# Patient Record
Sex: MALE | Race: WHITE | NOT HISPANIC OR LATINO | URBAN - METROPOLITAN AREA
[De-identification: names, ages, dates, MRNs, and addresses within clinical notes are randomized per-mention and may not be internally consistent; named-entity substitution may affect disease eponyms.]

---

## 2023-03-14 ENCOUNTER — OFFICE VISIT (OUTPATIENT)
Dept: URBAN - METROPOLITAN AREA CLINIC 63 | Facility: CLINIC | Age: 28
End: 2023-03-14
Payer: COMMERCIAL

## 2023-03-14 ENCOUNTER — WEB ENCOUNTER (OUTPATIENT)
Dept: URBAN - METROPOLITAN AREA CLINIC 63 | Facility: CLINIC | Age: 28
End: 2023-03-14

## 2023-03-14 VITALS
OXYGEN SATURATION: 98 % | HEIGHT: 70 IN | HEART RATE: 89 BPM | WEIGHT: 315 LBS | SYSTOLIC BLOOD PRESSURE: 120 MMHG | BODY MASS INDEX: 45.1 KG/M2 | TEMPERATURE: 96.3 F | DIASTOLIC BLOOD PRESSURE: 80 MMHG

## 2023-03-14 DIAGNOSIS — K62.5 RECTAL BLEEDING: ICD-10-CM

## 2023-03-14 DIAGNOSIS — K64.8 OTHER HEMORRHOIDS: ICD-10-CM

## 2023-03-14 DIAGNOSIS — E66.01 MORBID OBESITY: ICD-10-CM

## 2023-03-14 PROBLEM — 238136002: Status: ACTIVE | Noted: 2023-03-14

## 2023-03-14 PROCEDURE — 99205 OFFICE O/P NEW HI 60 MIN: CPT | Performed by: NURSE PRACTITIONER

## 2023-03-14 RX ORDER — MULTIVIT-MIN/FOLIC/VIT K/LYCOP 400-300MCG
1 TABLET WITH WATER OR JUICE BETWEEN MEALS TABLET ORAL ONCE A DAY
Status: ACTIVE | COMMUNITY

## 2023-03-14 RX ORDER — ONDANSETRON HYDROCHLORIDE 4 MG/1
1 TABLET TABLET, FILM COATED ORAL
Qty: 2 | Refills: 0 | OUTPATIENT
Start: 2023-03-14

## 2023-03-14 RX ORDER — SOY PROTEIN
AS DIRECTED POWDER (GRAM) ORAL
Status: ACTIVE | COMMUNITY

## 2023-03-14 RX ORDER — HYDROCORTISONE 25 MG/G
1 APPLICATION CREAM TOPICAL TWICE A DAY
Qty: 1 | Refills: 1 | OUTPATIENT
Start: 2023-03-14

## 2023-03-14 RX ORDER — FLUOXETINE HYDROCHLORIDE 20 MG/1
1 CAPSULE CAPSULE ORAL ONCE A DAY
Status: ACTIVE | COMMUNITY

## 2023-03-14 NOTE — HPI-PREVIOUS IMAGING
CT abdomen and pelvis/09 March 2023.  1.  No high-grade stenosis.  No aneurysm or dissection.  2.  No evidence of gastrointestinal hemorrhage.  3.  Decreased hepatic attenuation may be related to underlying edema/hepatitis or hepatic steatosis.  Correlation with lab values and physical examination is recommended.

## 2023-03-14 NOTE — HPI-HPI
Thank you very much for kindly referring Terry Andersen, a very pleasant 27-year-old male, to our service due to rectal bleeding.  Past medical history significant for asthma, OCD, IBS-D, anxiety, obesity and GERD.  Past surgical history is none.  He is EGD and colonoscopy naive and relates a maternal history of unresectable colon polyp, but denies a family history of colon cancer or other GI malignancy.  He does relate 2 days use of suppositories that have provided not much benefit.  Terry presents today complaining of painless rectal bleeding that has been going on for the past 2 months and frequent bowel movements (7/day) that are exacerbated by anxiety.  He was previously diagnosed with IBS-D but relates he has never had rectal bleeding before.  Also, he relates a low-grade fever (100F) with his last episode of rectal bleeding and a, "uneasiness in my stomach" that improves with time.  He is otherwise asymptomatic from a GI perspective and relates his normal bowel habits are typically 2/day of soft brown stool.  He denies pyrosis, dysphagia, dyspepsia, abdominal pain, hematochezia, melena or unintentional weight loss.  Recent ER evaluation due to rectal bleeding demonstrated hemorrhoidal disease and he was supposed to have been prescribed Proctosol, but he states he never did (see ER summary below).  **********  As per [facility] ED provider note dated 09 March 2023 (Eduardo Owen DO): "Patient is a 27 y.o. male who presents the emergency department via Walk-in with complaints of abdominal pain, nausea, fever and bloody stools that he has had for the last 2 days.  Reports having approximately 8 episodes per day.  All episodes have involved where of blood per rectum.  States that there is enough blood to turn the toilet bowl water red.  Does have a history of hemorrhoids and IBS.  Does not take any blood thinners.  States he also had a fever with a Tmax of 100  degree(s) F. Physical exam demonstrates mild abdominal tenderness in the periumbilical area and a small, nonthrombosed external hemorrhoid that is not actively bleeding located in the 7 o'clock position. ER course: Patient presents to the emergency department for abdominal pain, diarrhea, rectal bleeding, nausea and fever that he has had for the last 2 days.  He has a history of IBS.  On exam, he has mild periumbilical tenderness.  He has an external hemorrhoid that is nonthrombosed and not actively bleeding. 6:59 PM: Upon reassessment, patient is resting comfortably.  Upon review of the results, patient does have anemia with a hemoglobin of 12.4.  Uncertain as to the chronicity of this finding.  Patient does have leukopenia which is nonspecific.  Patient will be referred to hematology for further evaluation of his leukopenia.  Patient's BUN is normal which I would expect to be elevated in setting of an upper GI bleed.  Patient's bleeding likely lower GI in origin.  Etiology of bleeding is unclear.  Could be secondary to hemorrhoids but I feel patient requires further gastroenterology evaluation but I feel patient is stable to proceed this evaluation as an outpatient.  Patient will be prescribed Proctosol for treatment of his hemorrhoids and I stressed the importance of follow-up.  Additional verbal discharge instructions were given and discussed with the patient.  The patient was also instructed to return immediately if they get worse, do not get better, or if they develop any new or concerning symptoms.  The patient was instructed to follow-up with their PCP in the next 2 to 3 days.  Discussed results and plan with available patient/family/healthcare proxy.  All are in agreement with plan as listed above."

## 2023-03-14 NOTE — HPI-PREVIOUS LABS
Lab work dated 09 March 2023 demonstrates the following abnormalities: WBC 3.2, hemoglobin 12.4, hematocrit 37.2%, absolute lymphocytes 0.9, anion gap 5, total bilirubin 1.4.  All remaining lab values of CBC, CMP, PT/INR and TSH are within normal limits.

## 2023-03-21 ENCOUNTER — LAB OUTSIDE AN ENCOUNTER (OUTPATIENT)
Dept: URBAN - METROPOLITAN AREA CLINIC 63 | Facility: CLINIC | Age: 28
End: 2023-03-21

## 2023-07-18 ENCOUNTER — OFFICE VISIT (OUTPATIENT)
Dept: URBAN - METROPOLITAN AREA MEDICAL CENTER 15 | Facility: MEDICAL CENTER | Age: 28
End: 2023-07-18
Payer: COMMERCIAL

## 2023-07-18 DIAGNOSIS — K62.5 RECTAL BLEEDING: ICD-10-CM

## 2023-07-18 DIAGNOSIS — R19.7 DIARRHEA: ICD-10-CM

## 2023-07-18 DIAGNOSIS — K64.0 FIRST DEGREE HEMORRHOIDS: ICD-10-CM

## 2023-07-18 PROCEDURE — 45380 COLONOSCOPY AND BIOPSY: CPT | Performed by: INTERNAL MEDICINE

## 2023-07-28 ENCOUNTER — WEB ENCOUNTER (OUTPATIENT)
Dept: URBAN - METROPOLITAN AREA CLINIC 63 | Facility: CLINIC | Age: 28
End: 2023-07-28

## 2023-07-29 ENCOUNTER — TELEPHONE ENCOUNTER (OUTPATIENT)
Dept: URBAN - METROPOLITAN AREA CLINIC 63 | Facility: CLINIC | Age: 28
End: 2023-07-29

## 2023-08-03 ENCOUNTER — WEB ENCOUNTER (OUTPATIENT)
Dept: URBAN - METROPOLITAN AREA CLINIC 63 | Facility: CLINIC | Age: 28
End: 2023-08-03

## 2023-08-07 ENCOUNTER — WEB ENCOUNTER (OUTPATIENT)
Dept: URBAN - METROPOLITAN AREA CLINIC 63 | Facility: CLINIC | Age: 28
End: 2023-08-07

## 2023-08-29 ENCOUNTER — TELEPHONE ENCOUNTER (OUTPATIENT)
Dept: URBAN - METROPOLITAN AREA CLINIC 63 | Facility: CLINIC | Age: 28
End: 2023-08-29

## 2023-08-29 ENCOUNTER — OFFICE VISIT (OUTPATIENT)
Dept: URBAN - METROPOLITAN AREA CLINIC 63 | Facility: CLINIC | Age: 28
End: 2023-08-29
Payer: COMMERCIAL

## 2023-08-29 ENCOUNTER — WEB ENCOUNTER (OUTPATIENT)
Dept: URBAN - METROPOLITAN AREA CLINIC 63 | Facility: CLINIC | Age: 28
End: 2023-08-29

## 2023-08-29 VITALS
BODY MASS INDEX: 45.1 KG/M2 | WEIGHT: 315 LBS | HEART RATE: 95 BPM | TEMPERATURE: 97.3 F | SYSTOLIC BLOOD PRESSURE: 124 MMHG | OXYGEN SATURATION: 97 % | HEIGHT: 70 IN | DIASTOLIC BLOOD PRESSURE: 84 MMHG

## 2023-08-29 DIAGNOSIS — K50.114 CROHN'S DISEASE OF COLON WITH ABSCESS: ICD-10-CM

## 2023-08-29 PROBLEM — 50440006: Status: ACTIVE | Noted: 2023-08-29

## 2023-08-29 PROCEDURE — 99214 OFFICE O/P EST MOD 30 MIN: CPT | Performed by: NURSE PRACTITIONER

## 2023-08-29 RX ORDER — SOY PROTEIN
AS DIRECTED POWDER (GRAM) ORAL
Status: ACTIVE | COMMUNITY

## 2023-08-29 RX ORDER — RISANKIZUMAB-RZAA 360 MG/2.4
INJECT ONCE EVERY 56 DAYS WEARABLE INJECTOR SUBCUTANEOUS
Qty: 1 | Refills: 5 | OUTPATIENT
Start: 2024-01-22 | End: 2024-12-23

## 2023-08-29 RX ORDER — RISANKIZUMAB-RZAA 60 MG/ML
AS DIRECTED INJECTION INTRAVENOUS
OUTPATIENT
Start: 2023-08-29

## 2023-08-29 RX ORDER — ATOMOXETINE 40 MG/1
CAPSULE ORAL
Qty: 60 CAPSULE | Status: ACTIVE | COMMUNITY

## 2023-08-29 RX ORDER — MULTIVIT-MIN/FOLIC/VIT K/LYCOP 400-300MCG
1 TABLET WITH WATER OR JUICE BETWEEN MEALS TABLET ORAL ONCE A DAY
Status: ACTIVE | COMMUNITY

## 2023-08-29 NOTE — HPI-PREVIOUS LABS
Lab work dated 02 August 2023 demonstrates the following abnormalities: Hemoglobin 12.8, hematocrit 39.5%, total protein 8.3.  All remaining lab values of CBC, CMP and lactic acid are within normal limits. ********** Lab work dated 09 March 2023 demonstrates the following abnormalities: WBC 3.2, hemoglobin 12.4, hematocrit 37.2%, absolute lymphocytes 0.9, anion gap 5, total bilirubin 1.4.  All remaining lab values of CBC, CMP, PT/INR and TSH are within normal limits.

## 2023-08-29 NOTE — HPI-PREVIOUS PROCEDURES
Colonoscopy/18 July 2023.  Normal colonoscopy including terminal ileum, other than internal hemorrhoids. PATHOLOGY: Biopsies demonstrate mild chronic ileitis and reactive lymphoid follicle in the terminal ileum biopsy, benign mucosa with lymphoid follicle in the ascending colon polyp biopsy.  All remaining findings are negative or benign. IMPRESSION: The patient's bleeding is likely hemorrhoidal.  I see no other etiology.  I suspect his diarrhea is IBS related, but the pathology will be reviewed.

## 2023-08-29 NOTE — HPI-HPI
Terry Andersen is a very pleasant 28-year-old male seen in follow-up of colonoscopy and CT imaging (see results below).  He presents today feeling well but admits to 3-4 bowel movements per day of [place] 4-6 stool with occasional bright red blood on the paper with wiping.  He is otherwise asymptomatic from a GI perspective and denies pyrosis, dysphagia, abdominal pain, cramping, melena or unintentional weight loss.

## 2023-09-01 ENCOUNTER — WEB ENCOUNTER (OUTPATIENT)
Dept: URBAN - METROPOLITAN AREA CLINIC 63 | Facility: CLINIC | Age: 28
End: 2023-09-01

## 2023-09-25 ENCOUNTER — WEB ENCOUNTER (OUTPATIENT)
Dept: URBAN - METROPOLITAN AREA CLINIC 63 | Facility: CLINIC | Age: 28
End: 2023-09-25

## 2023-10-27 ENCOUNTER — WEB ENCOUNTER (OUTPATIENT)
Dept: URBAN - METROPOLITAN AREA CLINIC 63 | Facility: CLINIC | Age: 28
End: 2023-10-27

## 2023-10-31 ENCOUNTER — DASHBOARD ENCOUNTERS (OUTPATIENT)
Age: 28
End: 2023-10-31

## 2023-10-31 ENCOUNTER — OFFICE VISIT (OUTPATIENT)
Dept: URBAN - METROPOLITAN AREA CLINIC 63 | Facility: CLINIC | Age: 28
End: 2023-10-31
Payer: COMMERCIAL

## 2023-10-31 VITALS
HEIGHT: 70 IN | TEMPERATURE: 97.4 F | DIASTOLIC BLOOD PRESSURE: 82 MMHG | BODY MASS INDEX: 45.1 KG/M2 | HEART RATE: 90 BPM | WEIGHT: 315 LBS | OXYGEN SATURATION: 97 % | SYSTOLIC BLOOD PRESSURE: 126 MMHG

## 2023-10-31 DIAGNOSIS — K50.114 CROHN'S DISEASE OF COLON WITH ABSCESS: ICD-10-CM

## 2023-10-31 PROCEDURE — 99214 OFFICE O/P EST MOD 30 MIN: CPT | Performed by: NURSE PRACTITIONER

## 2023-10-31 RX ORDER — VILAZODONE HYDROCHLORIDE 10 MG/1
1 TABLET WITH FOOD TABLET, FILM COATED ORAL ONCE A DAY
Qty: 30 | Status: ACTIVE | COMMUNITY
Start: 2023-10-31

## 2023-10-31 RX ORDER — RISANKIZUMAB-RZAA 60 MG/ML
AS DIRECTED INJECTION INTRAVENOUS
Status: ACTIVE | COMMUNITY
Start: 2023-08-29

## 2023-10-31 RX ORDER — ATOMOXETINE 40 MG/1
CAPSULE ORAL
Qty: 60 CAPSULE | Status: ACTIVE | COMMUNITY

## 2023-10-31 RX ORDER — SOY PROTEIN
AS DIRECTED POWDER (GRAM) ORAL
Status: ACTIVE | COMMUNITY

## 2023-10-31 NOTE — HPI-HPI
Terry Andersen is a very pleasant 28-year-old male seen in follow-up of lab work and stool study secondary to Crohn's disease.  He is joined by his mother and has questions about the diagnosis, the findings of the procedures and imaging and long-term prognosis.  He presents today complaining of continued abdominal discomfort with 2-3 bowel movements per day of soft stool, but without blood or melena.  He does admit to use of medical THC via smoked bud, but denies use of tobacco products.  He is otherwise asymptomatic from a GI perspective and denies pyrosis, dysphagia, dyspepsia, severe abdominal pain or unintentional weight loss.

## 2023-10-31 NOTE — HPI-PREVIOUS IMAGING
CT abdomen and pelvis with contrast/02 August 2023.  1.  Mild fat stranding of the terminal ileum, nonspecific.  No evidence of appendicitis.  The perianal abscess appears grossly resolved.  Given the patient's age, and history of perianal fistula/abscess as well as slight abnormality of the terminal ileum, gastroenterology referral to evaluate for Crohn's disease is recommended.  2.  Hepatomegaly with severe hepatic steatosis.  Spleen at the upper limits of normal in size. ********** CT pelvis with contrast/29 July 2023.  Small midline posterior perianal abscess. ********** CT abdomen and pelvis/09 March 2023.  1.  No high-grade stenosis.  No aneurysm or dissection.  2.  No evidence of gastrointestinal hemorrhage.  3.  Decreased hepatic attenuation may be related to underlying edema/hepatitis or hepatic steatosis.  Correlation with lab values and physical examination is recommended.

## 2023-10-31 NOTE — HPI-PREVIOUS LABS
Lab work dated 23 October 2023 demonstrates the following abnormalities: Hemoglobin 12.8, hematocrit 38.4%, CRP 20.6. All remaining lab values of CBC, CMP, fecal lactoferrin, QuantiFERON-TB gold plus and HBV surface antigen are negative, nonreactive are within normal limits. Fecal calprotectin results pending. ********** Lab work dated 02 August 2023 demonstrates the following abnormalities: Hemoglobin 12.8, hematocrit 39.5%, total protein 8.3. All remaining lab values of CBC, CMP and lactic acid are within normal limits. ********** Lab work dated 09 March 2023 demonstrates the following abnormalities: WBC 3.2, hemoglobin 12.4, hematocrit 37.2%, absolute lymphocytes 0.9, anion gap 5, total bilirubin 1.4. All remaining lab values of CBC, CMP, PT/INR and TSH are within normal limits.

## 2023-11-08 ENCOUNTER — WEB ENCOUNTER (OUTPATIENT)
Dept: URBAN - METROPOLITAN AREA CLINIC 63 | Facility: CLINIC | Age: 28
End: 2023-11-08

## 2023-11-15 ENCOUNTER — WEB ENCOUNTER (OUTPATIENT)
Dept: URBAN - METROPOLITAN AREA CLINIC 63 | Facility: CLINIC | Age: 28
End: 2023-11-15

## 2024-08-07 ENCOUNTER — LAB OUTSIDE AN ENCOUNTER (OUTPATIENT)
Dept: URBAN - METROPOLITAN AREA CLINIC 57 | Facility: CLINIC | Age: 29
End: 2024-08-07

## 2024-08-07 ENCOUNTER — OFFICE VISIT (OUTPATIENT)
Dept: URBAN - METROPOLITAN AREA CLINIC 57 | Facility: CLINIC | Age: 29
End: 2024-08-07
Payer: COMMERCIAL

## 2024-08-07 VITALS
SYSTOLIC BLOOD PRESSURE: 128 MMHG | DIASTOLIC BLOOD PRESSURE: 82 MMHG | HEART RATE: 84 BPM | BODY MASS INDEX: 45.1 KG/M2 | OXYGEN SATURATION: 97 % | WEIGHT: 315 LBS | HEIGHT: 70 IN

## 2024-08-07 DIAGNOSIS — K50.114 CROHN'S DISEASE OF COLON WITH ABSCESS: ICD-10-CM

## 2024-08-07 DIAGNOSIS — K60.3 PERIANAL FISTULA: ICD-10-CM

## 2024-08-07 PROCEDURE — 99214 OFFICE O/P EST MOD 30 MIN: CPT

## 2024-08-07 RX ORDER — VILAZODONE HYDROCHLORIDE 10 MG/1
1 TABLET WITH FOOD TABLET, FILM COATED ORAL ONCE A DAY
Qty: 30 | Status: ACTIVE | COMMUNITY
Start: 2023-10-31

## 2024-08-07 RX ORDER — SOY PROTEIN
AS DIRECTED POWDER (GRAM) ORAL
Status: ACTIVE | COMMUNITY

## 2024-08-07 RX ORDER — RISANKIZUMAB-RZAA 360 MG/2.4
INJECT ONE 360MG CARTRIDGE ONCE EVERY 8 WEEKS WEARABLE INJECTOR SUBCUTANEOUS
Qty: 1 | Refills: 5 | Status: ACTIVE | COMMUNITY
Start: 2024-02-07 | End: 2025-01-08

## 2024-08-07 NOTE — HPI-PREVIOUS LABS
7/23/2024 CBC significant for hemoglobin 12.7, hematocrit 38.0, CMP significant for anion gap of 3 but otherwise within normal limits including LFTs. ********** Lab work dated 23 October 2023 demonstrates the following abnormalities: Hemoglobin 12.8, hematocrit 38.4%, CRP 20.6. All remaining lab values of CBC, CMP, fecal lactoferrin, QuantiFERON-TB gold plus and HBV surface antigen are negative, nonreactive are within normal limits. Fecal calprotectin results pending. ********** Lab work dated 02 August 2023 demonstrates the following abnormalities: Hemoglobin 12.8, hematocrit 39.5%, total protein 8.3. All remaining lab values of CBC, CMP and lactic acid are within normal limits. ********** Lab work dated 09 March 2023 demonstrates the following abnormalities: WBC 3.2, hemoglobin 12.4, hematocrit 37.2%, absolute lymphocytes 0.9, anion gap 5, total bilirubin 1.4. All remaining lab values of CBC, CMP, PT/INR and TSH are within normal limits.

## 2024-08-07 NOTE — HPI-PREVIOUS IMAGING
7/23/2024 CT pelvis with contrast consistent with 4.7 x 1.8 x 3.5 cm perianal abscess.  Likelihood that this arises from a perianal fistula however fistula is not visible due to inherent limitations of CT.  Recommend outpatient MRI. *********** CT abdomen and pelvis with contrast/02 August 2023.  1.  Mild fat stranding of the terminal ileum, nonspecific.  No evidence of appendicitis.  The perianal abscess appears grossly resolved.  Given the patient's age, and history of perianal fistula/abscess as well as slight abnormality of the terminal ileum, gastroenterology referral to evaluate for Crohn's disease is recommended.  2.  Hepatomegaly with severe hepatic steatosis.  Spleen at the upper limits of normal in size. ********** CT pelvis with contrast/29 July 2023.  Small midline posterior perianal abscess. ********** CT abdomen and pelvis/09 March 2023.  1.  No high-grade stenosis.  No aneurysm or dissection.  2.  No evidence of gastrointestinal hemorrhage.  3.  Decreased hepatic attenuation may be related to underlying edema/hepatitis or hepatic steatosis.  Correlation with lab values and physical examination is recommended.

## 2024-08-07 NOTE — HPI-ZZZTODAY'S VISIT
Patient is a very pleasant 29-year-old male who presents for follow-up.  He is a patient of Dr. Zarate.  Last seen by DUSTY Lopez 10/31/2023.  Past medical history significant for , perianal abscess, IBS-D, depression, anxiety disorder, asthma, OCD, hypothyroidism, morbid obesity.  Past surgical history reviewed.  Last colonoscopy July 18, 2023.  Family history significant for GERD and great mother on paternal side with colon cancer.  Terry presents with his mom today for chief complaint of follow-up of Crohn's disease.  He has been on Skyrizi since October 2023.  He has completed induction dosing and has had at least 1 on body injection.  He has not previously been on other medications.  He does respond to steroids but they have not been used recently.  Most recent imaging secondary to hospital visit on 7/23/2024 for rectal fistula.  Extraintestinal manifestations including arthralgias.  Inflammatory markers including fecal calprotectin not elevated, CRP does reflect inflammation.  Last biologic testing October 2023.  Risk factors for severe disease include morbid obesity, young age of diagnosis, fistulizing disease, male.  No personal history of cancer or cardiac history. Terry relates that he has not seen an improvement in his symptoms since induction of Skyrizi.  He continues to have between 2-4 bowel movements a day that are loose with tenesmus.  He also has mucus in the stool.  He does relate that it is unclear if this is related to the perirectal abscess that he was recently diagnosed with in the ER.  His mother is curious if it is possible that he has IBS rather than IBD.  He also complains of lethargy and his mom has noticed he has dark circles under his eyes.  He has tolerated Skyrizi well with no complaints.  He denies dysphagia, dyspepsia, pyrosis, change in bowel habits, unintentional weight loss, melena, hematochezia. As previously stated patient was in the hospital 7/23/2020 for and found to have rectal fistula.  He was given topical medication and referred for outpatient procedure with Dr. Love.  He has an appointment scheduled.

## 2024-10-16 ENCOUNTER — OFFICE VISIT (OUTPATIENT)
Dept: URBAN - METROPOLITAN AREA CLINIC 57 | Facility: CLINIC | Age: 29
End: 2024-10-16

## 2024-11-18 ENCOUNTER — OFFICE VISIT (OUTPATIENT)
Dept: URBAN - METROPOLITAN AREA CLINIC 57 | Facility: CLINIC | Age: 29
End: 2024-11-18

## 2024-11-20 ENCOUNTER — TELEPHONE ENCOUNTER (OUTPATIENT)
Dept: URBAN - METROPOLITAN AREA CLINIC 57 | Facility: CLINIC | Age: 29
End: 2024-11-20

## 2024-11-22 ENCOUNTER — OFFICE VISIT (OUTPATIENT)
Dept: URBAN - METROPOLITAN AREA CLINIC 57 | Facility: CLINIC | Age: 29
End: 2024-11-22

## 2024-11-22 RX ORDER — SOY PROTEIN
AS DIRECTED POWDER (GRAM) ORAL
Status: ACTIVE | COMMUNITY

## 2024-11-22 RX ORDER — VILAZODONE HYDROCHLORIDE 10 MG/1
1 TABLET WITH FOOD TABLET, FILM COATED ORAL ONCE A DAY
Qty: 30 | Status: ACTIVE | COMMUNITY
Start: 2023-10-31

## 2024-11-22 RX ORDER — RISANKIZUMAB-RZAA 360 MG/2.4
INJECT ONE 360MG CARTRIDGE ONCE EVERY 8 WEEKS WEARABLE INJECTOR SUBCUTANEOUS
Qty: 1 | Refills: 5 | Status: ACTIVE | COMMUNITY
Start: 2024-02-07 | End: 2025-01-08

## 2024-11-22 NOTE — HPI-PREVIOUS LABS
10/2/2024 B12 folate within normal limits, CRP elevated at 27, fecal calprotectin within normal limits at 33, TB hep B negative, CMP within normal limits including LFTs, iron panel within normal limits including ferritin, CBC significant for hemoglobin of 12.2, hematocrit 37.7 11/6/2024 Prometheus pattern not consistent with IBD 7/23/2024 CBC significant for hemoglobin 12.7, hematocrit 38.0, CMP significant for anion gap of 3 but otherwise within normal limits including LFTs. ********** Lab work dated 23 October 2023 demonstrates the following abnormalities: Hemoglobin 12.8, hematocrit 38.4%, CRP 20.6. All remaining lab values of CBC, CMP, fecal lactoferrin, QuantiFERON-TB gold plus and HBV surface antigen are negative, nonreactive are within normal limits. Fecal calprotectin results pending. ********** Lab work dated 02 August 2023 demonstrates the following abnormalities: Hemoglobin 12.8, hematocrit 39.5%, total protein 8.3. All remaining lab values of CBC, CMP and lactic acid are within normal limits. ********** Lab work dated 09 March 2023 demonstrates the following abnormalities: WBC 3.2, hemoglobin 12.4, hematocrit 37.2%, absolute lymphocytes 0.9, anion gap 5, total bilirubin 1.4. All remaining lab values of CBC, CMP, PT/INR and TSH are within normal limits.

## 2024-11-22 NOTE — HPI-ZZZTODAY'S VISIT
Patient is a very pleasant 29-year-old male who presents for follow-up.  He is a patient of Dr. Zarate.  I last saw him 8/7/2024.  Past medical history significant for perianal abscess, IBS-D, depression, anxiety, asthma, OCD, hypothyroidism, morbid obesity.  Past surgical history reviewed.  Last colonoscopy July 18, 2023.  Family history significant for GERD and great mother paternal side with colon cancer. Previously patient presented for Crohn's disease.  Patient is followed for Crohn's disease.  He has been on Skyrizi since October 2023.  This is his first biologic medication.  He responds to steroids but has not been on them recently.  He has been hospitalized 7/23/2024 for rectal fistula.  Extraintestinal manifestations include arthralgias.  Inflammatory markers demonstrate fecal [place] not elevated but CRP does.  Updated biologic testing 10/2/2024 with negative hep B and TB.  Risk factors for severe disease include morbid obesity, young age of diagnosis, fistulizing disease, male.  No personal history of cancer or cardiac history. His previous office visit right and his mother were questioning if he has IBS or IBD because he has not seen improvement since induction of Skyrizi.  He is continuing to have 2-4 bowel movements a day with tenesmus and mucus in the stool.  It is unclear if this is related to the perirectal abscess.  He also has lethargy and dark circles under his eyes.  He has tolerated the Skyrizi with no complaints.

## 2024-12-05 ENCOUNTER — TELEPHONE ENCOUNTER (OUTPATIENT)
Dept: URBAN - METROPOLITAN AREA CLINIC 68 | Facility: CLINIC | Age: 29
End: 2024-12-05

## 2024-12-06 ENCOUNTER — OFFICE VISIT (OUTPATIENT)
Dept: URBAN - METROPOLITAN AREA CLINIC 57 | Facility: CLINIC | Age: 29
End: 2024-12-06

## 2024-12-06 NOTE — HPI-TODAY'S VISIT:
Patient is a very pleasant 29-year-old male who presents for follow-up.  He is a patient of Dr. Zarate.  I last saw him 8/7/2024.  Past medical history significant for morbid obesity, hypothyroidism, OCD, asthma, anxiety/depression, IBS-D, perianal abscess, Crohn's disease.  Past surgical history reviewed.  Last colonoscopy July 18, 2023.  Family history significant for GERD and great grandmother on paternal side with colon cancer.  Patient presents for follow-up of Crohn's disease.  He has been on Skyrizi since October 2023.  He has not previously been on other medications.  He does respond to steroids but has not required them recently.  He had evidence of fistula on an ER visit from 7/23/2024.  Extraintestinal manifestations include arthralgias.  Inflammatory markers do not demonstrate fecal calprotectin but do reflect CRP elevation.  Risk for severe disease include morbid obesity, young age of diagnosis, fistulizing disease and male sex.  No personal history of cancer or cardiac history.  Patient has not seen much improvement in symptoms since induction of Skyrizi.  He continues to have between 2-4 bowel movements a day that are loose with tenesmus.  He also has mucus in the stool.  His mother is curious if he has IBS rather than IBD.  She notices that she is lethargic and has noticed dark circles under his eyes.  He has tolerated Skyrizi with no difficulty. As previously stated patient was in the hospital 7/23/2024 for rectal abscess.  He was giving topical medication and referred to colorectal surgery outpatient.  We did repeat lab work and CT enterography for further evaluation of patient's Crohn's disease. Previous labs: 10/2/2024 vitamin B 12 and folate within normal limits, CRP elevated at 27.6, fecal calprotectin within normal limits, TB and hep B negative, CMP within normal limits including LFTs, iron panel within normal limits, CBC significant for hemoglobin of 12.2, hematocrit 37.7 Prometheus testing with pattern not consistent with IBD

## 2024-12-10 ENCOUNTER — LAB OUTSIDE AN ENCOUNTER (OUTPATIENT)
Dept: URBAN - METROPOLITAN AREA CLINIC 63 | Facility: CLINIC | Age: 29
End: 2024-12-10

## 2024-12-12 ENCOUNTER — TELEPHONE ENCOUNTER (OUTPATIENT)
Dept: URBAN - METROPOLITAN AREA CLINIC 68 | Facility: CLINIC | Age: 29
End: 2024-12-12

## 2024-12-13 ENCOUNTER — P2P PATIENT RECORD (OUTPATIENT)
Age: 29
End: 2024-12-13

## 2024-12-13 ENCOUNTER — OFFICE VISIT (OUTPATIENT)
Dept: URBAN - METROPOLITAN AREA CLINIC 57 | Facility: CLINIC | Age: 29
End: 2024-12-13
Payer: COMMERCIAL

## 2024-12-13 ENCOUNTER — TELEPHONE ENCOUNTER (OUTPATIENT)
Dept: URBAN - METROPOLITAN AREA CLINIC 63 | Facility: CLINIC | Age: 29
End: 2024-12-13

## 2024-12-13 VITALS
DIASTOLIC BLOOD PRESSURE: 80 MMHG | BODY MASS INDEX: 45.1 KG/M2 | SYSTOLIC BLOOD PRESSURE: 128 MMHG | WEIGHT: 315 LBS | HEIGHT: 70 IN

## 2024-12-13 DIAGNOSIS — K60.30 PERIANAL FISTULA: ICD-10-CM

## 2024-12-13 DIAGNOSIS — K76.0 HEPATIC STEATOSIS: ICD-10-CM

## 2024-12-13 DIAGNOSIS — K50.114 CROHN'S DISEASE OF COLON WITH ABSCESS: ICD-10-CM

## 2024-12-13 PROBLEM — 197321007: Status: ACTIVE | Noted: 2024-12-13

## 2024-12-13 PROBLEM — 58103005: Status: ACTIVE | Noted: 2024-12-13

## 2024-12-13 PROCEDURE — 99214 OFFICE O/P EST MOD 30 MIN: CPT

## 2024-12-13 RX ORDER — RISANKIZUMAB-RZAA 360 MG/2.4
INJECT ONE 360MG CARTRIDGE ONCE EVERY 8 WEEKS WEARABLE INJECTOR SUBCUTANEOUS
Qty: 1 | Refills: 5
Start: 2024-02-07 | End: 2025-11-14

## 2024-12-13 RX ORDER — VILAZODONE HYDROCHLORIDE 10 MG/1
1 TABLET WITH FOOD TABLET, FILM COATED ORAL ONCE A DAY
Qty: 30 | Status: ACTIVE | COMMUNITY
Start: 2023-10-31

## 2024-12-13 RX ORDER — RISANKIZUMAB-RZAA 360 MG/2.4
INJECT ONE 360MG CARTRIDGE ONCE EVERY 8 WEEKS WEARABLE INJECTOR SUBCUTANEOUS
Qty: 1 | Refills: 5 | Status: ACTIVE | COMMUNITY
Start: 2024-02-07 | End: 2025-01-08

## 2024-12-13 RX ORDER — SOY PROTEIN
AS DIRECTED POWDER (GRAM) ORAL
Status: ACTIVE | COMMUNITY

## 2024-12-13 NOTE — HPI-PREVIOUS IMAGING
12/10/2024 CT enterography consistent with no active inflammatory process identified in small or large bowel Significant hepatic steatosis with hepatomegaly moderate to severe recommend elastography *********** 7/23/2024 CT pelvis with contrast consistent with 4.7 x 1.8 x 3.5 cm perianal abscess.  Likelihood that this arises from a perianal fistula however fistula is not visible due to inherent limitations of CT.  Recommend outpatient MRI. *********** CT abdomen and pelvis with contrast/02 August 2023.  1.  Mild fat stranding of the terminal ileum, nonspecific.  No evidence of appendicitis.  The perianal abscess appears grossly resolved.  Given the patient's age, and history of perianal fistula/abscess as well as slight abnormality of the terminal ileum, gastroenterology referral to evaluate for Crohn's disease is recommended.  2.  Hepatomegaly with severe hepatic steatosis.  Spleen at the upper limits of normal in size. ********** CT pelvis with contrast/29 July 2023.  Small midline posterior perianal abscess. ********** CT abdomen and pelvis/09 March 2023.  1.  No high-grade stenosis.  No aneurysm or dissection.  2.  No evidence of gastrointestinal hemorrhage.  3.  Decreased hepatic attenuation may be related to underlying edema/hepatitis or hepatic steatosis.  Correlation with lab values and physical examination is recommended.

## 2024-12-13 NOTE — HPI-TODAY'S VISIT:
Patient is a very pleasant 29-year-old male who presents for follow-up.  He was a patient of Dr. Zarate. We transfered care to Dr. Valentin.  I last saw him 8/7/2024.  Past medical history significant for morbid obesity, hypothyroidism, OCD, asthma, anxiety/depression, IBS-D, perianal abscess, Crohn's disease.  Past surgical history reviewed.  Last colonoscopy July 18, 2023.  Family history significant for GERD and great grandmother on paternal side with colon cancer.  Patient presents for follow-up of Crohn's disease.  He has been on Skyrizi since October 2023.  He has not previously been on other medications.  He does respond to steroids but has not required them recently.  He had evidence of fistula on an ER visit from 7/23/2024.  Extraintestinal manifestations include arthralgias.  Inflammatory markers do not demonstrate fecal calprotectin but do reflect CRP elevation.  Risk for severe disease include morbid obesity, young age of diagnosis, fistulizing disease and male sex.  No personal history of cancer or cardiac history.  Patient has not seen much improvement in symptoms since induction of Skyrizi.  He continues to have between 2-4 bowel movements a day that are loose with tenesmus.  He also has mucus in the stool.  His mother is curious if he has IBS rather than IBD.  She notices that she is lethargic and has noticed dark circles under his eyes.  He has tolerated Skyrizi with no difficulty. As previously stated patient was in the hospital 7/23/2024 for rectal abscess. We did repeat lab work and CT enterography for further evaluation of patient's Crohn's disease.  Patient presents for follow-up.  He continues to have previously related symptoms.  He does relay previously established fistula is draining again.  He has an appoint with a colorectal surgeon on Tuesday next week.  He is having 3 bowel movements a day with no blood or mucus in the stool currently.  He denies dysphagia, dyspepsia, pyrosis, abdominal pain, unintentional weight loss, melena, hematochezia.

## 2024-12-13 NOTE — HPI-PREVIOUS LABS
10/2/2024 B12 folate within normal limits, CRP elevated at 27, fecal calprotectin within normal limits at 33, TB hep B negative, CMP within normal limits including LFTs, iron panel within normal limits including ferritin, CBC significant for hemoglobin of 12.2, hematocrit 37.7 ********** 11/6/2024 Prometheus pattern not consistent with IBD  *********** 7/23/2024 CBC significant for hemoglobin 12.7, hematocrit 38.0, CMP significant for anion gap of 3 but otherwise within normal limits including LFTs. ********** Lab work dated 23 October 2023 demonstrates the following abnormalities: Hemoglobin 12.8, hematocrit 38.4%, CRP 20.6. All remaining lab values of CBC, CMP, fecal lactoferrin, QuantiFERON-TB gold plus and HBV surface antigen are negative, nonreactive are within normal limits. Fecal calprotectin results pending. ********** Lab work dated 02 August 2023 demonstrates the following abnormalities: Hemoglobin 12.8, hematocrit 39.5%, total protein 8.3. All remaining lab values of CBC, CMP and lactic acid are within normal limits. ********** Lab work dated 09 March 2023 demonstrates the following abnormalities: WBC 3.2, hemoglobin 12.4, hematocrit 37.2%, absolute lymphocytes 0.9, anion gap 5, total bilirubin 1.4. All remaining lab values of CBC, CMP, PT/INR and TSH are within normal limits.

## 2024-12-26 ENCOUNTER — TELEPHONE ENCOUNTER (OUTPATIENT)
Dept: URBAN - METROPOLITAN AREA CLINIC 23 | Facility: CLINIC | Age: 29
End: 2024-12-26

## 2025-04-21 ENCOUNTER — OFFICE VISIT (OUTPATIENT)
Dept: URBAN - METROPOLITAN AREA CLINIC 57 | Facility: CLINIC | Age: 30
End: 2025-04-21

## 2025-04-21 RX ORDER — RISANKIZUMAB-RZAA 360 MG/2.4
INJECT ONE 360MG CARTRIDGE ONCE EVERY 8 WEEKS WEARABLE INJECTOR SUBCUTANEOUS
Qty: 1 | Refills: 5 | COMMUNITY
Start: 2024-02-07 | End: 2025-11-14

## 2025-04-21 RX ORDER — SOY PROTEIN
AS DIRECTED POWDER (GRAM) ORAL
COMMUNITY

## 2025-04-21 RX ORDER — VILAZODONE HYDROCHLORIDE 10 MG/1
1 TABLET WITH FOOD TABLET, FILM COATED ORAL ONCE A DAY
Qty: 30 | COMMUNITY
Start: 2023-10-31

## 2025-05-08 ENCOUNTER — TELEPHONE ENCOUNTER (OUTPATIENT)
Dept: URBAN - METROPOLITAN AREA CLINIC 57 | Facility: CLINIC | Age: 30
End: 2025-05-08